# Patient Record
Sex: FEMALE | Race: OTHER | Employment: OTHER | URBAN - METROPOLITAN AREA
[De-identification: names, ages, dates, MRNs, and addresses within clinical notes are randomized per-mention and may not be internally consistent; named-entity substitution may affect disease eponyms.]

---

## 2018-11-08 NOTE — PATIENT DISCUSSION
Recommended artificial tears to use: 1 drop 4x a day in both eyes as needed, Refresh Optive recommended brand. Recommended warm compresses 10 minutes once a day. Continue Flaxseed oil by mouth, increase dosage with increased symtoms.

## 2021-12-21 NOTE — PATIENT DISCUSSION
Probing and Irrigation performed today.  OD > OS had initial resistance, but then flushed through to throat. No blockage noted.

## 2021-12-21 NOTE — PATIENT DISCUSSION
Instructed patient to increase moisturizing drops through out the day and start Refresh PM at bedtime.

## 2021-12-21 NOTE — PROCEDURE NOTE: CLINICAL
PROCEDURE NOTE: Probing of Lacrimal Canaliculi, With or Without Irrigation OU. Diagnosis: Dysfunctional Tear Syndrome. Prep: Betadine Flush. Risks, benefits and alternatives discussed. The patient desires to proceed with probe and irrigation of the involved puncta today and the puncta/lacrimal system was found to be patent/blocked. See chart plan notes for further discussion. Patient tolerated the procedure well and left in good condition. Lucie Merino

## 2022-03-17 ENCOUNTER — NEW PATIENT (OUTPATIENT)
Dept: URBAN - METROPOLITAN AREA CLINIC 13 | Facility: CLINIC | Age: 77
End: 2022-03-17

## 2022-03-17 DIAGNOSIS — H25.13: ICD-10-CM

## 2022-03-17 DIAGNOSIS — H52.02: ICD-10-CM

## 2022-03-17 DIAGNOSIS — Z79.899: ICD-10-CM

## 2022-03-17 PROCEDURE — 92015 DETERMINE REFRACTIVE STATE: CPT

## 2022-03-17 PROCEDURE — 92004 COMPRE OPH EXAM NEW PT 1/>: CPT

## 2022-03-17 PROCEDURE — 92250 FUNDUS PHOTOGRAPHY W/I&R: CPT

## 2022-03-17 ASSESSMENT — KERATOMETRY
OS_K2POWER_DIOPTERS: 44.00
OS_AXISANGLE_DEGREES: 008
OS_K1POWER_DIOPTERS: 43.00
OS_AXISANGLE2_DEGREES: 98
OD_K1POWER_DIOPTERS: 43.00
OD_K2POWER_DIOPTERS: 44.25
OD_AXISANGLE2_DEGREES: 88
OD_AXISANGLE_DEGREES: 178

## 2022-03-17 ASSESSMENT — VISUAL ACUITY
OS_SC: 20/25
OU_SC: 20/25
OD_SC: 20/400

## 2022-03-17 ASSESSMENT — TONOMETRY
OS_IOP_MMHG: 11
OD_IOP_MMHG: 13

## 2022-10-06 ENCOUNTER — DIAGNOSTICS ONLY (OUTPATIENT)
Dept: URBAN - METROPOLITAN AREA CLINIC 13 | Facility: CLINIC | Age: 77
End: 2022-10-06

## 2022-10-06 DIAGNOSIS — H52.02: ICD-10-CM

## 2022-10-06 DIAGNOSIS — Z79.899: ICD-10-CM

## 2022-10-06 DIAGNOSIS — Z01.00: ICD-10-CM

## 2022-10-06 PROCEDURE — 99213 OFFICE O/P EST LOW 20 MIN: CPT

## 2022-10-06 PROCEDURE — 92250 FUNDUS PHOTOGRAPHY W/I&R: CPT

## 2022-10-06 ASSESSMENT — TONOMETRY
OS_IOP_MMHG: 7
OD_IOP_MMHG: 9

## 2022-10-06 ASSESSMENT — VISUAL ACUITY
OD_SC: 20/400
OU_SC: 20/25
OS_SC: 20/25

## 2023-05-18 ENCOUNTER — ESTABLISHED PATIENT (OUTPATIENT)
Dept: URBAN - METROPOLITAN AREA CLINIC 13 | Facility: CLINIC | Age: 78
End: 2023-05-18

## 2023-05-18 DIAGNOSIS — Z79.899: ICD-10-CM

## 2023-05-18 DIAGNOSIS — H52.02: ICD-10-CM

## 2023-05-18 DIAGNOSIS — H25.13: ICD-10-CM

## 2023-05-18 PROCEDURE — 92014 COMPRE OPH EXAM EST PT 1/>: CPT

## 2023-05-18 PROCEDURE — 92250 FUNDUS PHOTOGRAPHY W/I&R: CPT

## 2023-05-18 PROCEDURE — 92015 DETERMINE REFRACTIVE STATE: CPT

## 2023-05-18 ASSESSMENT — KERATOMETRY
OS_AXISANGLE2_DEGREES: 90
OS_K1POWER_DIOPTERS: 43.00
OD_AXISANGLE2_DEGREES: 68
OD_K1POWER_DIOPTERS: 42.75
OS_K2POWER_DIOPTERS: 43.50
OS_AXISANGLE_DEGREES: 180
OD_AXISANGLE_DEGREES: 158
OD_K2POWER_DIOPTERS: 43.50

## 2023-05-18 ASSESSMENT — VISUAL ACUITY
OS_SC: 20/25-3
OD_SC: CF 3FT
OU_SC: 20/20-2

## 2023-05-18 ASSESSMENT — TONOMETRY
OS_IOP_MMHG: 14
OD_IOP_MMHG: 12

## 2023-11-16 ENCOUNTER — DIAGNOSTICS ONLY (OUTPATIENT)
Dept: URBAN - METROPOLITAN AREA CLINIC 13 | Facility: CLINIC | Age: 78
End: 2023-11-16

## 2023-11-16 DIAGNOSIS — Z79.899: ICD-10-CM

## 2023-11-16 DIAGNOSIS — H25.13: ICD-10-CM

## 2023-11-16 PROCEDURE — 92014 COMPRE OPH EXAM EST PT 1/>: CPT

## 2023-11-16 PROCEDURE — 92134 CPTRZ OPH DX IMG PST SGM RTA: CPT

## 2023-11-16 PROCEDURE — 92083 EXTENDED VISUAL FIELD XM: CPT

## 2023-11-16 ASSESSMENT — TONOMETRY
OS_IOP_MMHG: 16
OD_IOP_MMHG: 16

## 2023-11-16 ASSESSMENT — VISUAL ACUITY
OU_SC: 20/25
OS_SC: 20/25
OD_SC: 20/400

## 2024-09-19 ENCOUNTER — COMPREHENSIVE EXAM (OUTPATIENT)
Dept: URBAN - METROPOLITAN AREA CLINIC 13 | Facility: CLINIC | Age: 79
End: 2024-09-19

## 2024-09-19 DIAGNOSIS — Z79.899: ICD-10-CM

## 2024-09-19 DIAGNOSIS — H52.02: ICD-10-CM

## 2024-09-19 DIAGNOSIS — H25.13: ICD-10-CM

## 2024-09-19 PROCEDURE — 92012 INTRM OPH EXAM EST PATIENT: CPT

## 2024-09-19 PROCEDURE — 92015 DETERMINE REFRACTIVE STATE: CPT

## 2025-03-20 ENCOUNTER — CONSULTATION/EVALUATION (OUTPATIENT)
Age: 80
End: 2025-03-20

## 2025-03-20 DIAGNOSIS — H25.13: ICD-10-CM

## 2025-03-20 PROCEDURE — 99214 OFFICE O/P EST MOD 30 MIN: CPT

## 2025-05-20 ENCOUNTER — COMPREHENSIVE EXAM (OUTPATIENT)
Age: 80
End: 2025-05-20

## 2025-05-20 DIAGNOSIS — Z79.899: ICD-10-CM

## 2025-05-20 DIAGNOSIS — H25.13: ICD-10-CM

## 2025-05-20 PROCEDURE — 99214 OFFICE O/P EST MOD 30 MIN: CPT

## 2025-05-20 PROCEDURE — 92134 CPTRZ OPH DX IMG PST SGM RTA: CPT

## 2025-05-20 PROCEDURE — 92136 OPHTHALMIC BIOMETRY: CPT

## 2025-06-13 ENCOUNTER — EMERGENCY VISIT (OUTPATIENT)
Age: 80
End: 2025-06-13

## 2025-06-13 DIAGNOSIS — H10.13: ICD-10-CM

## 2025-06-13 PROCEDURE — 99214 OFFICE O/P EST MOD 30 MIN: CPT

## 2025-06-17 ENCOUNTER — CONSULTATION/EVALUATION (OUTPATIENT)
Age: 80
End: 2025-06-17

## 2025-06-17 DIAGNOSIS — H53.021: ICD-10-CM

## 2025-06-17 DIAGNOSIS — H10.12: ICD-10-CM

## 2025-06-17 DIAGNOSIS — H25.13: ICD-10-CM

## 2025-06-17 DIAGNOSIS — H52.7: ICD-10-CM

## 2025-06-17 DIAGNOSIS — Z79.899: ICD-10-CM

## 2025-06-17 DIAGNOSIS — H43.821: ICD-10-CM

## 2025-06-17 PROCEDURE — 92014 COMPRE OPH EXAM EST PT 1/>: CPT

## 2025-06-17 PROCEDURE — 92134 CPTRZ OPH DX IMG PST SGM RTA: CPT

## 2025-07-08 ENCOUNTER — SURGERY/PROCEDURE (OUTPATIENT)
Age: 80
End: 2025-07-08

## 2025-07-08 ENCOUNTER — POST OP/EVAL OF SECOND EYE (OUTPATIENT)
Age: 80
End: 2025-07-08

## 2025-07-08 DIAGNOSIS — Z96.1: ICD-10-CM

## 2025-07-08 DIAGNOSIS — Z98.890: ICD-10-CM

## 2025-07-08 DIAGNOSIS — H25.13: ICD-10-CM

## 2025-07-08 DIAGNOSIS — H25.12: ICD-10-CM

## 2025-07-08 PROCEDURE — 66984 XCAPSL CTRC RMVL W/O ECP: CPT

## 2025-07-09 ENCOUNTER — SURGERY/PROCEDURE (OUTPATIENT)
Age: 80
End: 2025-07-09

## 2025-07-09 DIAGNOSIS — H25.13: ICD-10-CM

## 2025-07-09 PROCEDURE — 66984 XCAPSL CTRC RMVL W/O ECP: CPT | Mod: 79,LT

## 2025-07-10 ENCOUNTER — POST-OP (OUTPATIENT)
Age: 80
End: 2025-07-10

## 2025-07-10 DIAGNOSIS — Z98.890: ICD-10-CM

## 2025-07-10 PROCEDURE — 99024 POSTOP FOLLOW-UP VISIT: CPT

## 2025-07-24 ENCOUNTER — POST-OP (OUTPATIENT)
Age: 80
End: 2025-07-24

## 2025-07-24 DIAGNOSIS — Z96.1: ICD-10-CM

## 2025-07-24 PROCEDURE — 99024 POSTOP FOLLOW-UP VISIT: CPT
